# Patient Record
Sex: MALE | Race: WHITE | Employment: FULL TIME | URBAN - METROPOLITAN AREA
[De-identification: names, ages, dates, MRNs, and addresses within clinical notes are randomized per-mention and may not be internally consistent; named-entity substitution may affect disease eponyms.]

---

## 2017-08-30 ENCOUNTER — TREATMENT (OUTPATIENT)
Dept: PHYSICAL THERAPY | Facility: CLINIC | Age: 32
End: 2017-08-30

## 2017-08-30 DIAGNOSIS — Z02.1 PRE-EMPLOYMENT HEALTH SCREENING EXAMINATION: Primary | ICD-10-CM

## 2017-08-30 PROCEDURE — PDS: Performed by: PHYSICAL THERAPIST

## 2017-08-30 NOTE — PROGRESS NOTES
See full documentation of patient's full treatment in paper chart dated 8/30/2017.  Chart will be in [facility] dept.  For Mark.

## 2018-05-30 ENCOUNTER — OFFICE VISIT CONVERTED (OUTPATIENT)
Dept: ORTHOPEDIC SURGERY | Facility: CLINIC | Age: 33
End: 2018-05-30
Attending: PHYSICIAN ASSISTANT

## 2018-06-20 ENCOUNTER — OFFICE VISIT CONVERTED (OUTPATIENT)
Dept: ORTHOPEDIC SURGERY | Facility: CLINIC | Age: 33
End: 2018-06-20
Attending: PHYSICIAN ASSISTANT

## 2021-05-16 VITALS — RESPIRATION RATE: 18 BRPM | HEIGHT: 74 IN | WEIGHT: 275 LBS | BODY MASS INDEX: 35.29 KG/M2

## 2021-05-16 VITALS — WEIGHT: 275 LBS | BODY MASS INDEX: 35.29 KG/M2 | HEIGHT: 74 IN | RESPIRATION RATE: 16 BRPM
